# Patient Record
Sex: MALE | Race: WHITE | HISPANIC OR LATINO | Employment: OTHER | URBAN - METROPOLITAN AREA
[De-identification: names, ages, dates, MRNs, and addresses within clinical notes are randomized per-mention and may not be internally consistent; named-entity substitution may affect disease eponyms.]

---
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
## 2022-02-02 ENCOUNTER — TELEPHONE (OUTPATIENT)
Dept: PEDIATRICS CLINIC | Age: 22
End: 2022-02-02

## 2022-05-09 ENCOUNTER — TELEPHONE (OUTPATIENT)
Dept: LAB | Facility: HOSPITAL | Age: 22
End: 2022-05-09

## 2022-05-10 NOTE — TELEPHONE ENCOUNTER
05/10/22 6:41 PM     Thank you for your request  Your request has been received, reviewed, and the patient chart updated  The PCP has successfully been removed with a patient attribution note  This message will now be completed      Thank you  Boston Paul

## 2023-04-26 ENCOUNTER — TELEPHONE (OUTPATIENT)
Dept: LAB | Facility: HOSPITAL | Age: 23
End: 2023-04-26

## 2023-05-04 ENCOUNTER — APPOINTMENT (OUTPATIENT)
Dept: LAB | Facility: HOSPITAL | Age: 23
End: 2023-05-04

## 2023-05-04 DIAGNOSIS — Z00.00 ANNUAL PHYSICAL EXAM: ICD-10-CM

## 2023-05-04 LAB
ALBUMIN SERPL BCP-MCNC: 4.2 G/DL (ref 3.5–5)
ALP SERPL-CCNC: 82 U/L (ref 46–116)
ALT SERPL W P-5'-P-CCNC: 67 U/L (ref 12–78)
ANION GAP SERPL CALCULATED.3IONS-SCNC: 7 MMOL/L (ref 4–13)
AST SERPL W P-5'-P-CCNC: 30 U/L (ref 5–45)
BASOPHILS # BLD AUTO: 0.01 THOUSANDS/ÂΜL (ref 0–0.1)
BASOPHILS NFR BLD AUTO: 0 % (ref 0–1)
BILIRUB SERPL-MCNC: 0.56 MG/DL (ref 0.2–1)
BUN SERPL-MCNC: 11 MG/DL (ref 5–25)
CALCIUM SERPL-MCNC: 9.5 MG/DL (ref 8.3–10.1)
CHLORIDE SERPL-SCNC: 105 MMOL/L (ref 96–108)
CHOLEST SERPL-MCNC: 134 MG/DL
CO2 SERPL-SCNC: 27 MMOL/L (ref 21–32)
CREAT SERPL-MCNC: 0.94 MG/DL (ref 0.6–1.3)
EOSINOPHIL # BLD AUTO: 0.07 THOUSAND/ÂΜL (ref 0–0.61)
EOSINOPHIL NFR BLD AUTO: 1 % (ref 0–6)
ERYTHROCYTE [DISTWIDTH] IN BLOOD BY AUTOMATED COUNT: 13.2 % (ref 11.6–15.1)
GFR SERPL CREATININE-BSD FRML MDRD: 115 ML/MIN/1.73SQ M
GLUCOSE P FAST SERPL-MCNC: 92 MG/DL (ref 65–99)
HCT VFR BLD AUTO: 50.9 % (ref 36.5–49.3)
HDLC SERPL-MCNC: 40 MG/DL
HGB BLD-MCNC: 16.8 G/DL (ref 12–17)
IMM GRANULOCYTES # BLD AUTO: 0.01 THOUSAND/UL (ref 0–0.2)
IMM GRANULOCYTES NFR BLD AUTO: 0 % (ref 0–2)
LDLC SERPL CALC-MCNC: 66 MG/DL (ref 0–100)
LYMPHOCYTES # BLD AUTO: 2.04 THOUSANDS/ÂΜL (ref 0.6–4.47)
LYMPHOCYTES NFR BLD AUTO: 27 % (ref 14–44)
MCH RBC QN AUTO: 27.5 PG (ref 26.8–34.3)
MCHC RBC AUTO-ENTMCNC: 33 G/DL (ref 31.4–37.4)
MCV RBC AUTO: 83 FL (ref 82–98)
MONOCYTES # BLD AUTO: 0.67 THOUSAND/ÂΜL (ref 0.17–1.22)
MONOCYTES NFR BLD AUTO: 9 % (ref 4–12)
NEUTROPHILS # BLD AUTO: 4.69 THOUSANDS/ÂΜL (ref 1.85–7.62)
NEUTS SEG NFR BLD AUTO: 63 % (ref 43–75)
NONHDLC SERPL-MCNC: 94 MG/DL
NRBC BLD AUTO-RTO: 0 /100 WBCS
PLATELET # BLD AUTO: 247 THOUSANDS/UL (ref 149–390)
PMV BLD AUTO: 10.4 FL (ref 8.9–12.7)
POTASSIUM SERPL-SCNC: 4.1 MMOL/L (ref 3.5–5.3)
PROT SERPL-MCNC: 8 G/DL (ref 6.4–8.4)
RBC # BLD AUTO: 6.1 MILLION/UL (ref 3.88–5.62)
SODIUM SERPL-SCNC: 139 MMOL/L (ref 135–147)
TRIGL SERPL-MCNC: 140 MG/DL
TSH SERPL DL<=0.05 MIU/L-ACNC: 1.55 UIU/ML (ref 0.45–4.5)
WBC # BLD AUTO: 7.49 THOUSAND/UL (ref 4.31–10.16)

## 2023-05-06 LAB
GAMMA INTERFERON BACKGROUND BLD IA-ACNC: 0.04 IU/ML
M TB IFN-G BLD-IMP: NEGATIVE
M TB IFN-G CD4+ BCKGRND COR BLD-ACNC: 0.01 IU/ML
M TB IFN-G CD4+ BCKGRND COR BLD-ACNC: 0.01 IU/ML
MITOGEN IGNF BCKGRD COR BLD-ACNC: >10 IU/ML

## 2023-05-08 ENCOUNTER — TELEPHONE (OUTPATIENT)
Age: 23
End: 2023-05-08

## 2023-05-08 NOTE — TELEPHONE ENCOUNTER
----- Message from [address] sent at 5/7/2023  5:25 PM EDT -----  Please let the patient's parent know there is no significant abnormality on his lab work  Please provide his mother with a copy of the [employer] gold, that is documentation for his TB testing

## 2023-05-15 ENCOUNTER — TELEPHONE (OUTPATIENT)
Age: 23
End: 2023-05-15

## 2023-05-15 NOTE — TELEPHONE ENCOUNTER
Mom contacted the office to request a copy of patients TB results  Results faxed to mom at [phone]  Confirmation received and scanned

## 2023-05-16 ENCOUNTER — TELEPHONE (OUTPATIENT)
Age: 23
End: 2023-05-16

## 2023-05-16 NOTE — TELEPHONE ENCOUNTER
Received request for medical records from [address] on 5/16/2023 to [facility] phone [phone]

## 2023-05-24 ENCOUNTER — TELEPHONE (OUTPATIENT)
Age: 23
End: 2023-05-24

## 2023-05-24 NOTE — TELEPHONE ENCOUNTER
Sotero Overton will be dropping off form to be completed correctly by Manfred  She said she'll come into the office this Fri May 26       The correct box has to be checked- [facility] ID Care (care in home and community)    Manfred signed it; she has to cross out nursing home, date, initial and sign again

## 2023-05-26 NOTE — TELEPHONE ENCOUNTER
The box was checked off by Manfred and initialed, given to [place] to be scanned in     Then Nat Marti came back and dropped off physical form to be filled out by Manfred, left in black bin in reception     Call Ofelia Lee when ready to be picked up # [phone]

## 2023-08-03 DIAGNOSIS — R05.9 COUGH, UNSPECIFIED TYPE: Primary | ICD-10-CM

## 2023-08-03 DIAGNOSIS — R52 PAIN, GENERALIZED: ICD-10-CM

## 2023-08-03 RX ORDER — MENTHOL
LOZENGE MUCOUS MEMBRANE 3 TIMES DAILY PRN
Qty: 100 G | Refills: 0 | Status: SHIPPED | OUTPATIENT
Start: 2023-08-03

## 2023-08-03 RX ORDER — ACETAMINOPHEN 160 MG/5ML
320 SUSPENSION ORAL EVERY 4 HOURS PRN
Qty: 118 ML | Refills: 2 | Status: SHIPPED | OUTPATIENT
Start: 2023-08-03

## 2023-08-08 ENCOUNTER — OFFICE VISIT (OUTPATIENT)
Dept: URGENT CARE | Age: 23
End: 2023-08-08
Payer: COMMERCIAL

## 2023-08-08 VITALS — OXYGEN SATURATION: 98 % | HEART RATE: 135 BPM | TEMPERATURE: 99.2 F | RESPIRATION RATE: 16 BRPM

## 2023-08-08 DIAGNOSIS — R05.1 ACUTE COUGH: Primary | ICD-10-CM

## 2023-08-08 PROCEDURE — 99213 OFFICE O/P EST LOW 20 MIN: CPT | Performed by: PHYSICIAN ASSISTANT

## 2023-08-08 RX ORDER — BENZONATATE 100 MG/1
100 CAPSULE ORAL 3 TIMES DAILY PRN
Qty: 20 CAPSULE | Refills: 0 | Status: SHIPPED | OUTPATIENT
Start: 2023-08-08

## 2023-08-08 NOTE — PATIENT INSTRUCTIONS
Madalyn Gupta as prescribed. If symptoms are not improved in 3-5 days, follow-up with PCP. If symptoms worsen or new symptoms develop, report to the emergency department immediately.

## 2023-08-08 NOTE — PROGRESS NOTES
[facility]        NAME: Luigi Valdes is a 26 y.o. male  : 2000    MRN: [id number]  DATE: 2023  TIME: 11:33 AM    Assessment and Plan   Acute cough [R05.1]  1. Acute cough  benzonatate (TESSALON PERLES) 100 mg capsule      Pt presents with acute cough of likely viral origin. Rx for Tessalon Perles to treat as he cannot take pseudoephedrine or dextromethorphan. Patient Instructions     Patient Instructions   [employer] as prescribed. If symptoms are not improved in 3-5 days, follow-up with PCP. If symptoms worsen or new symptoms develop, report to the emergency department immediately. Follow up with PCP in 3-5 days. Proceed to  ER if symptoms worsen. Chief Complaint     Chief Complaint   Patient presents with   • Cough     2 day duration of wet sounding cough         History of Present Illness       26year old mentally disabled male presents with group home staff with concerns of wet sounding cough x 2 days. Staff denies fever, chills, runny nose, and congestion. No other concerns or complaints today. Review of Systems   Review of Systems   Constitutional: Negative for chills, fatigue and fever. HENT: Negative for congestion, postnasal drip, rhinorrhea, sore throat, trouble swallowing and voice change. Respiratory: Positive for cough. Negative for shortness of breath. Cardiovascular: Negative for chest pain. Gastrointestinal: Negative for diarrhea, nausea and vomiting. Musculoskeletal: Negative for myalgias. Neurological: Negative for headaches.          Current Medications       Current Outpatient Medications:   •  benzonatate (TESSALON PERLES) 100 mg capsule, Take 1 capsule (100 mg total) by mouth 3 (three) times a day as needed for cough, Disp: 20 capsule, Rfl: 0  •  acetaminophen (TYLENOL) 160 mg/5 mL liquid, Take 10 mL (320 mg total) by mouth every 4 (four) hours as needed for mild pain or moderate pain, Disp: 118 mL, Rfl: 2  •  albuterol (2.5 mg/3 mL) 0.083 % nebulizer solution, Use every 4 hours as needed for wheezing via nebulizer. (Patient not taking: Reported on 4/21/2023), Disp: 30 vial, Rfl: 3  •  albuterol (PROVENTIL HFA,VENTOLIN HFA) 90 mcg/act inhaler, Use 2 puffs every 4 hours for wheezing or severe cough as needed (Patient not taking: Reported on 4/21/2023), Disp: 2 Inhaler, Rfl: 3  •  busPIRone (BUSPAR) 5 mg tablet, Take 5 mg by mouth 2 (two) times a day, Disp: , Rfl: 0  •  Camphor-Eucalyptus-Menthol (Vicks Vaporub) 4.73-1.2-2.6 % OINT, Apply topically 3 (three) times a day as needed (cough), Disp: 100 g, Rfl: 0  •  clonazePAM (KlonoPIN) 1 mg tablet, TAKE 1 TABLET BY MOUTH EVERY DAY AS NEEDED AGITATION OR 1 HOUR PRIOR TO PROCEDURES (Patient not taking: Reported on 4/21/2023), Disp: , Rfl: 1  •  diphenhydrAMINE (DIPHENHIST) 12.5 mg/5 mL oral liquid, Take 10 ml PO PRN congestion.  (Patient not taking: Reported on 4/21/2023), Disp: 120 mL, Rfl: 0  •  guanFACINE (TENEX) 2 MG tablet, Take 2 mg by mouth 2 (two) times a day, Disp: , Rfl: 0  •  hydrOXYzine (ATARAX) 10 mg/5 mL syrup, GIVE 2 TEASPOONSFUL BY MOUTH AT BEDTIME AS NEEDED, Disp: 120 mL, Rfl: 2  •  Polyethylene Glycol 3350 GRAN, Take by mouth daily (Patient not taking: Reported on 4/21/2023), Disp: , Rfl:   •  risperiDONE (RisperDAL) 1 mg/mL oral solution, GIVE 1.25ML BY MOUTH TWICE A DAY, Disp: , Rfl: 1  •  Spacer/Aero-Holding Chambers (AEROCHAMBER PLUS) inhaler, Use as instructed (Patient not taking: Reported on 4/21/2023), Disp: 1 each, Rfl: 1  •  temazepam (RESTORIL) 15 mg capsule, TAKE 1 CAPSULE BY MOUTH EVERY DAY AT NIGHT, Disp: , Rfl:     Current Allergies     Allergies as of 08/08/2023   • (No Known Allergies)            The following portions of the patient's history were reviewed and updated as appropriate: allergies, current medications, past family history, past medical history, past social history, past surgical history and problem list.     Past Medical History:   Diagnosis Date • Autism 2005    Diagnosed at [facility], in [facility]   • Baby premature 32 weeks 2000    32 week Caesarean section at [facility]. Twin B. Required positive pressure ventilation in the OR, and then nasal CPAP. Birth weight 5 lb 9 oz. Discharged at 11days of age. • Heart murmur     Innocent heart murmur in early childhood   •  jaundice 2002    Required phototherapy   • Otitis media    • Pneumonia     Outpatient   • Pneumonia     Outpatient       Past Surgical History:   Procedure Laterality Date   • CIRCUMCISION  2002       Family History   Problem Relation Age of Onset   • No Known Problems Mother    • No Known Problems Father    • Autism Brother    • Seizures Brother    • Liver disease Brother         History of elevated liver enzymes, resolved   • Asthma Maternal Grandmother    • Diabetes Maternal Grandmother    • Hypertension Maternal Grandfather    • Hyperlipidemia Maternal Grandfather    • Diabetes Paternal Grandmother    • No Known Problems Paternal Grandfather    • Asthma Maternal Aunt    • Seizures Family         Distant relatives         Medications have been verified. Objective   Pulse (!) 135   Temp 99.2 °F (37.3 °C)   Resp 16   SpO2 98%   No LMP for male patient. Physical Exam     Physical Exam  Constitutional:       General: He is awake. He is not in acute distress. Appearance: Normal appearance. He is well-developed and well-groomed. He is not ill-appearing, toxic-appearing or diaphoretic. Comments: Examination severely limited as patient refused to enter clinic and was examined in hallway and would not allow exam beyond heart and lungs. HENT:      Head: Normocephalic and atraumatic. Right Ear: Hearing and external ear normal.      Left Ear: Hearing and external ear normal.   Eyes:      General: Lids are normal. Vision grossly intact. Gaze aligned appropriately.    Cardiovascular:      Rate and Rhythm: Normal rate and regular rhythm. Heart sounds: Normal heart sounds, S1 normal and S2 normal. Heart sounds not distant. No murmur heard. No friction rub. No gallop. Pulmonary:      Effort: Pulmonary effort is normal.      Breath sounds: Normal breath sounds. No decreased breath sounds, wheezing, rhonchi or rales. Comments: Patient is speaking in full sentences with no increased respiratory effort. No audible wheezing or stridor. Musculoskeletal:      Cervical back: Normal range of motion. Skin:     General: Skin is warm and dry. Neurological:      Mental Status: He is alert and oriented to person, place, and time. Coordination: Coordination is intact. Gait: Gait is intact. Psychiatric:         Attention and Perception: Attention and perception normal.         Mood and Affect: Mood and affect normal.         Speech: Speech normal.         Behavior: Behavior is agitated. Behavior is cooperative. Note: Portions of this record may have been created with voice recognition software. Occasional wrong word or "sound a like" substitutions may have occurred due to the inherent limitations of voice recognition software. Please read the chart carefully and recognize, using context, where substitutions have occurred. *

## 2023-09-05 ENCOUNTER — TELEPHONE (OUTPATIENT)
Age: 23
End: 2023-09-05

## 2023-09-05 DIAGNOSIS — E73.9 LACTOSE INTOLERANCE: Primary | ICD-10-CM

## 2023-09-05 DIAGNOSIS — J06.9 ACUTE URI: ICD-10-CM

## 2023-09-05 RX ORDER — NICOTINE POLACRILEX 2 MG/1
1 LOZENGE ORAL DAILY
Qty: 90 TABLET | Refills: 0 | Status: SHIPPED | OUTPATIENT
Start: 2023-09-05

## 2023-09-05 NOTE — TELEPHONE ENCOUNTER
Mother LVM that patient is in a group home and needs a script for liquid benadryl 12.5ml    Also needs script sent over for lactose tablets

## 2023-09-05 NOTE — TELEPHONE ENCOUNTER
Lactase 9000 units chewable was sent. Please ask mom to reach out to the ordering provider regarding switching antibiotic.

## 2023-09-05 NOTE — TELEPHONE ENCOUNTER
Please asked mom to confirm the dose of lactose tablet. Giovany Smith is also on hydroxyzine, Benadryl and hydroxyzine should never be given together.

## 2023-09-05 NOTE — TELEPHONE ENCOUNTER
Called mother and she wishes she new mg of lactulose it is otc and wondering if it could be written as directions on box and wondering if he could;d get a z- ovidio as was abelardo at [facility] clinic and they prescribed him 500 mg amoxicillin and he is still congested and this is why she has been giving benadryl to dry him up

## 2024-02-09 ENCOUNTER — TELEPHONE (OUTPATIENT)
Age: 24
End: 2024-02-09

## 2024-02-09 NOTE — TELEPHONE ENCOUNTER
Spoke to jordi and was notified he has not been seen in our office since 4/21/2023 and provider katy was seen by no longer here

## 2024-04-23 ENCOUNTER — OFFICE VISIT (OUTPATIENT)
Age: 24
End: 2024-04-23
Payer: COMMERCIAL

## 2024-04-23 DIAGNOSIS — R09.81 CONGESTION OF NASAL SINUS: ICD-10-CM

## 2024-04-23 DIAGNOSIS — Z00.00 ANNUAL PHYSICAL EXAM: Primary | ICD-10-CM

## 2024-04-23 DIAGNOSIS — R52 PAIN, GENERALIZED: ICD-10-CM

## 2024-04-23 DIAGNOSIS — F84.0 AUTISTIC DISORDER: ICD-10-CM

## 2024-04-23 PROCEDURE — 99395 PREV VISIT EST AGE 18-39: CPT

## 2024-04-23 RX ORDER — BUSPIRONE HYDROCHLORIDE 15 MG/1
15 TABLET ORAL 2 TIMES DAILY
COMMUNITY

## 2024-04-23 RX ORDER — ACETAMINOPHEN 160 MG/5ML
320 SUSPENSION ORAL EVERY 4 HOURS PRN
Qty: 118 ML | Refills: 2 | Status: SHIPPED | OUTPATIENT
Start: 2024-04-23

## 2024-04-23 RX ORDER — MULTIVIT-MIN/FOLIC ACID/HRB293 120 MCG-50
2 TABLET,CHEWABLE ORAL DAILY
Qty: 120 TABLET | Refills: 4 | Status: SHIPPED | OUTPATIENT
Start: 2024-04-23

## 2024-04-23 NOTE — ASSESSMENT & PLAN NOTE
Patient has severe autism and uncooperative with most of the physical exam.  Completed paperwork to the best of my ability, however was not able to obtain vital signs.  Patient does look a little overweight, but not morbidly obese.  Reconciled medications from group home/psych today.    Referral placed to social work to discuss whether there are options for this patient to get the exams he needs done on the same day and with sedation and monitoring as far as eye exams, dental, and podiatry.

## 2024-04-23 NOTE — PROGRESS NOTES
ADULT ANNUAL PHYSICAL  [facility] [place]    NAME: Jared Reyes  AGE: 24 y.o. SEX: male  : 2000     DATE: 2024     Assessment and Plan:     Problem List Items Addressed This Visit          Behavioral Health    Autistic disorder     Patient has severe autism and uncooperative with most of the physical exam.  Completed paperwork to the best of my ability, however was not able to obtain vital signs.  Patient does look a little overweight, but not morbidly obese.  Reconciled medications from group home/psych today.    Referral placed to social work to discuss whether there are options for this patient to get the exams he needs done on the same day and with sedation and monitoring as far as eye exams, dental, and podiatry.         Relevant Medications    busPIRone (BUSPAR) 15 mg tablet    Other Relevant Orders    Ambulatory Referral to Social Work Care Management Program     Other Visit Diagnoses       Annual physical exam    -  Primary    Up-to-date on immunizations, copy from chart given to mom along with paperwork.  TB was checked 1 year ago.    Relevant Medications    Sunscreen Sport SPF 70 LOTN    Multiple Vitamins-Minerals (Alive Mens Gummy Multivitamins) CHEW    Pain, generalized        Patient needed as needed Tylenol ordered for his group home.    Relevant Medications    acetaminophen (TYLENOL) 160 mg/5 mL liquid    Congestion of nasal sinus        Patient needed as needed Benadryl ordered for his group home for when he has congestion.  May give every 6 hours as needed.    Relevant Medications    diphenhydrAMINE (Diphenhist) 12.5 mg/5 mL oral liquid            Immunizations and preventive care screenings were discussed with patient today. Appropriate education was printed on patient's after visit summary.    Counseling:  Dental Health: discussed importance of regular tooth brushing, flossing, and dental visits.  Injury prevention: discussed  "safety/seat belts, safety helmets, smoke detectors, carbon dioxide detectors, and smoking near bedding or upholstery.  Exercise: the importance of regular exercise/physical activity was discussed. Recommend exercise 3-5 times per week for at least 30 minutes.       Depression Screening and Follow-up Plan: Patient not screened for depression due to medical reason (urgent/emergent situation, decreased capacity). Pt has severe autism and uncooperative and unable to answer questions.        Return in about 1 year (around 4/23/2025) for Annual physical.     Chief Complaint:     No chief complaint on file.     History of Present Illness:     Adult Annual Physical   Patient here for a comprehensive physical exam. The patient reports no problems.    Pt is resident of [facility].  Lives there with his brother.  Hx of autism, asthma, allergic rhinitis, and constipation.  Pt is here with mother and stepfather.    Overall pt is uncooperative with exam.  He would not allow vital signs or any type of physical exam done.  Pt is here with his mother and step father.  Patient observed and was able to palpate his skin, has a 2+ radial pulse, and he demonstrated the ability to hear, interact and answer questions appropriately (though it was usually to say no).    Mother reports he does not need rescue inhaler and has no issues with his asthma.    TDaP given one year ago.  TB quantiferon was negative 5/4/2023.  Other vaccines are UTD.  Pt required five people to hold him down to do TDaP last year.    Diet and Physical Activity  Diet/Nutrition: well balanced diet.   Exercise: no formal exercise.      Depression Screening  PHQ-2/9 Depression Screening           General Health  Sleep: sleeps poorly and is woken up every 30 minutes for a welfare check at his group home. .   Hearing: normal - bilateral and seems to interact appropriately when asked questions and answers \"no\" .  Vision: wears glasses and does not seem to have issues with " vision .   Dental:  pt uncooperative with exams .        Health  History of STDs?: no.    Advanced Care Planning  Do you have an advanced directive? no  Do you have a durable medical power of ? no  ACP document given to the patient? no     Review of Systems:     Review of Systems   Unable to perform ROS: Psychiatric disorder (PT is severely autistic and unable to answer)     Mother reports no acute issues at this time.        Past Medical History:     Past Medical History:   Diagnosis Date    Autism     Diagnosed at [facility], in [place], [place]    Baby premature 32 weeks 2000    32 week Caesarean section at [facility].  Twin B. Required positive pressure ventilation in the OR, and then nasal CPAP.  Birth weight 5 lb 9 oz.  Discharged at 5 days of age.    Heart murmur     Innocent heart murmur in early childhood     jaundice 2002    Required phototherapy    Otitis media     Pneumonia 2003    Outpatient    Pneumonia 2008    Outpatient      Past Surgical History:     Past Surgical History:   Procedure Laterality Date    CIRCUMCISION  2002      Social History:     Social History     Socioeconomic History    Marital status: Single     Spouse name: None    Number of children: None    Years of education: None    Highest education level: None   Occupational History    None   Tobacco Use    Smoking status: Never    Smokeless tobacco: Never   Substance and Sexual Activity    Alcohol use: None    Drug use: None    Sexual activity: None   Other Topics Concern    None   Social History Narrative    Lives with parents, , and twin brother    Carbon monoxide detector in home    Smoke detector in home    No tobacco/smoke exposure at home    No guns in the home    Pets: Cat     Social Determinants of Health     Financial Resource Strain: Not on file   Food Insecurity: Not on file   Transportation Needs: Not on file   Physical Activity: Not on file   Stress: Not  on file   Social Connections: Not on file   Intimate Partner Violence: Not on file   Housing Stability: Not on file      Family History:     Family History   Problem Relation Age of Onset    No Known Problems Mother     No Known Problems Father     Autism Brother     Seizures Brother     Liver disease Brother         History of elevated liver enzymes, resolved    Asthma Maternal Grandmother     Diabetes Maternal Grandmother     Hypertension Maternal Grandfather     Hyperlipidemia Maternal Grandfather     Diabetes Paternal Grandmother     No Known Problems Paternal Grandfather     Asthma Maternal Aunt     Seizures Family         Distant relatives      Current Medications:     Current Outpatient Medications   Medication Sig Dispense Refill    acetaminophen (TYLENOL) 160 mg/5 mL liquid Take 10 mL (320 mg total) by mouth every 4 (four) hours as needed for mild pain or moderate pain 118 mL 2    busPIRone (BUSPAR) 15 mg tablet Take 15 mg by mouth 2 (two) times a day      diphenhydrAMINE (Diphenhist) 12.5 mg/5 mL oral liquid Take 10 ml PO EVERY 6 HOURS PRN congestion. 120 mL 0    Multiple Vitamins-Minerals (Alive Mens Gummy Multivitamins) CHEW Chew 2 each in the morning 120 tablet 4    Sunscreen Sport SPF 70 LOTN Apply topically every 4 (four) hours as needed (when outside) 112 mL 5    benzonatate (TESSALON PERLES) 100 mg capsule Take 1 capsule (100 mg total) by mouth 3 (three) times a day as needed for cough 20 capsule 0    Camphor-Eucalyptus-Menthol (Vicks Vaporub) 4.73-1.2-2.6 % OINT Apply topically 3 (three) times a day as needed (cough) 100 g 0    guanFACINE (TENEX) 2 MG tablet Take 2 mg by mouth 2 (two) times a day  0    risperiDONE (RisperDAL) 1 mg/mL oral solution Take 2 mg by mouth 2 (two) times a day  1    temazepam (RESTORIL) 15 mg capsule Take 7.5 mg by mouth daily at bedtime       No current facility-administered medications for this visit.      Allergies:     No Known Allergies   Physical Exam:     There were  no vitals taken for this visit.    Physical Exam  Vitals and nursing note reviewed.   Constitutional:       General: He is not in acute distress.     Appearance: Normal appearance. He is not toxic-appearing.   HENT:      Head: Normocephalic and atraumatic.      Right Ear: Hearing and external ear normal.      Left Ear: Hearing and external ear normal.      Nose: Nose normal.      Mouth/Throat:      Lips: Pink.      Mouth: Mucous membranes are moist.   Eyes:      General: Lids are normal. Vision grossly intact.   Cardiovascular:      Rate and Rhythm: Normal rate.   Pulmonary:      Effort: Pulmonary effort is normal. No respiratory distress.   Abdominal:      General: Abdomen is flat.      Comments: Patient did not indicate any abdominal pain.   Musculoskeletal:         General: No tenderness, deformity or signs of injury.      Cervical back: Full passive range of motion without pain.      Comments: Pt is able to stand from the seated position, ambulated without assistance, moved all 4 extremities without difficulty, and did not indicate he was in any pain.   Skin:     General: Skin is warm and dry.      Capillary Refill: Capillary refill takes less than 2 seconds.      Coloration: Skin is not cyanotic or jaundiced.   Neurological:      Mental Status: He is alert. He is confused.      Comments: Patient has profound autism          Shyla Sparrow PA-C   [facility] [place]

## 2024-04-24 ENCOUNTER — PATIENT OUTREACH (OUTPATIENT)
Age: 24
End: 2024-04-24

## 2024-04-24 NOTE — PROGRESS NOTES
COSMO ARANA received referral for patient from Shyla Sparrow PA-C, regarding patient have difficulty at doctor's appointments. COSMO ARANA also discussed referral via IB messages with Shyla Sparrow. Per chart review, patient has severe autism and struggles with medical appointments. Referral was placed to see if there is a way patient can have medical exams done on the same day with sedation and monitoring as far as eye exams, dental and podiatry.    COSMO ARANA placed call to [employer] of [facility] and [employer] to ask if there is any assistance, are any resources or guidance they can provide to assist this patient and his mother. COSMO ARANA also sent an email to [employer] regarding this.    COSMO ARANA placed call to patient's mother, introduced self and role and explained reasoning for call. Patient's mother reported that the patient has always struggled with medical appointments, as he has high anxiety when it comes to them, and she reported that his toenails are so long, but she is unable to cut them. COSMO ARANA asked if patient does any better if it is with a provider he knows and has seen a few times, and patient's mother reported that it does not matter who it is. COSMO ARANA explained the outreaches made to the resources above to see if there is any guidance that can be given to assist with this. Patient's mother appreciative of outreach.    COSMO ARANA to continue to follow patient.

## 2024-05-01 ENCOUNTER — PATIENT OUTREACH (OUTPATIENT)
Age: 24
End: 2024-05-01

## 2024-05-01 NOTE — PROGRESS NOTES
COSMO ARANA placed follow-up call to the Autism Society of the [facility] to obtain information for patient's mother regarding patient's struggle with medical appointments. COSMO ARANA had to leave another voicemail.    COSMO ARANA also reached out to [employer] to follow-up and had to leave another voicemail. The email sent previously was also not received and bounced back to COSMO ARANA.    COSMO ARANA also placed a call to the [facility] and [facility] to obtain information. COSMO ARANA left a voicemail for a return call.    COSMO ARANA to continue to follow patient.   99

## 2024-05-02 ENCOUNTER — TELEPHONE (OUTPATIENT)
Age: 24
End: 2024-05-02

## 2024-05-02 NOTE — TELEPHONE ENCOUNTER
Asking if you requested patient was recommended to be on a toilet schedule?    Pt is in a group home.

## 2024-05-03 ENCOUNTER — PATIENT OUTREACH (OUTPATIENT)
Age: 24
End: 2024-05-03

## 2024-05-03 NOTE — PROGRESS NOTES
After Visit Summary   2017    Manuel Gasca    MRN: [id number]           Patient Information     Date Of Birth          1992        Visit Information        Provider Department      2017 2:45 PM Fay Young MD [facility] Ear Nose and Throat        Today's Diagnoses     Chronic pansinusitis    -  1    Cystic fibrosis with pulmonary manifestations (H)          Care Instructions    Please follow up in 17 at 8AM for general follow up.   Darin Graham RN  [phone]              Follow-ups after your visit        Who to contact     Please call your clinic at [phone] to:    Ask questions about your health    Make or cancel appointments    Discuss your medicines    Learn about your test results    Speak to your doctor   If you have compliments or concerns about an experience at your clinic, or if you wish to file a complaint, please contact [facility] Patient Relations at [phone] or email us at [email or web address]         Additional Information About Your Visit        MyChart Information     Matchbox is an electronic gateway that provides easy, online access to your medical records. With Matchbox, you can request a clinic appointment, read your test results, renew a prescription or communicate with your care team.     To sign up for Matchbox visit the website at [email or web address]   You will be asked to enter the access code listed below, as well as some personal information. Please follow the directions to create your username and password.     Your access code is: [email or web address]  Expires: 10/3/2017  6:30 AM     Your access code will  in 90 days. If you need help or a new code, please contact your [facility] or call [phone] for assistance.        Care EveryWhere ID     This is your Care EveryWhere ID. This could be used by other organizations to access your [facility]  COSMO ARANA reached out to [place] [facility] via email to obtain resources to assist patient with medical appointments.    COSMO ARANA to follow-up next week and provide outreach to patient's mother.   "records  [id number]        Your Vitals Were     Height BMI (Body Mass Index)                1.956 m (6' 5.01\") 24.78 kg/m2           Blood Pressure from Last 3 Encounters:   08/11/17 116/65   07/27/17 124/76   10/07/16 121/65    Weight from Last 3 Encounters:   08/23/17 94.8 kg (208 lb 15.9 oz)   08/11/17 94.8 kg (209 lb)   07/27/17 94.8 kg (209 lb 1.6 oz)              We Performed the Following     NASAL/SINUS SCOPE WITH BIOPSY/POLYPECT/DEBRIDE,ENT        Primary Care Provider Office Phone # Fax #    Delbert Jennings [phone]       [facility] [address] [facility] [address]        Equal Access to Services     BRITTANY HER : Sravan cummins Soemiilano, waaxda luqadaha, qaybta kaalmada radha, lenora bella . So [facility] [phone].    ATENCIÓN: Si habla español, tiene a toledo disposición servicios gratuitos de asistencia lingüística. Dorina al [phone].    We comply with applicable federal civil rights laws and [place] laws. We do not discriminate on the basis of race, color, national origin, age, disability sex, sexual orientation or gender identity.            Thank you!     Thank you for choosing [facility] EAR NOSE AND THROAT  for your care. Our goal is always to provide you with excellent care. Hearing back from our patients is one way we can continue to improve our services. Please take a few minutes to complete the written survey that you may receive in the mail after your visit with us. Thank you!             Your Updated Medication List - Protect others around you: Learn how to safely use, store and throw away your medicines at www.disposemymeds.org.          This list is accurate as of: 8/23/17 11:59 PM.  Always use your most recent med list.                   Brand Name Dispense Instructions for use Diagnosis    fluticasone 50 MCG/ACT spray    FLONASE    1 Bottle    Spray 2 sprays into both nostrils daily    Cystic fibrosis with " pulmonary manifestations (H)       HYDROcodone-acetaminophen 5-325 MG per tablet    NORCO    20 tablet    Take 1 tablet by mouth every 4 hours as needed for moderate to severe pain maximum 6 tablet(s) per day    Post-op pain       levofloxacin 500 MG tablet    LEVAQUIN    21 tablet    Take 1 tablet (500 mg) by mouth daily for 21 days    Cystic fibrosis (H)       methylPREDNISolone 4 MG tablet    MEDROL DOSEPAK    21 tablet    Take as directed per patient instruction card    Cystic fibrosis with pulmonary manifestations (H), Chronic frontal sinusitis       oxymetazoline 0.05 % spray    AFRIN NASAL SPRAY    1 Bottle    Spray 2-3 sprays into both nostrils 2 times daily as needed (Nose bleed)    Post-op pain       sodium chloride 0.65 % nasal spray    OCEAN    1 Bottle    Spray 1-2 sprays in nostril every hour as needed for congestion (nasal dryness) Use in EACH nostril.    Chronic pansinusitis

## 2024-05-07 ENCOUNTER — PATIENT OUTREACH (OUTPATIENT)
Age: 24
End: 2024-05-07

## 2024-05-07 NOTE — PROGRESS NOTES
COSMO ARANA received follow-up email from Karol from [facility] who provided COSMO ARANA with resources and tips to help manage patient's anxiety. COSMO ARANA also received voicemail from Karol.    COSMO ARANA sent a return email explaining that patient's mother was wondering if there were any services out there to assist with the medical appointments so that they can be completed in full.    COSMO ARANA placed call to patient's mother to give update and left a voicemail.    COSMO ARANA to continue to follow patient.

## 2024-05-09 ENCOUNTER — PATIENT OUTREACH (OUTPATIENT)
Age: 24
End: 2024-05-09

## 2024-05-09 NOTE — PROGRESS NOTES
COSMO ARANA placed follow-up call to Karol from [facility] to see if there were any resources to assist patient in medical appointments. COSMO ARANA was unable to leave a voicemail as the phone kept ringing.    COSMO CM to outreach patient's mother in about a week if return call is not provided prior from previous outreach on 5/7.

## 2024-05-14 ENCOUNTER — PATIENT OUTREACH (OUTPATIENT)
Age: 24
End: 2024-05-14

## 2024-05-14 NOTE — PROGRESS NOTES
COSMO ARANA received voicemail from patient's mother returning COSMO 's call from last week. Patient's mother reported that COSMO ARANA can call back or can email back at [email or web address].    COSMO ARANA placed return call to patient's mother to give update regarding looking for service to assist patient with anxiety around medical appointments. COSMO ARANA reported that Karol from [facility] got back to COSMO  and offered support group information as they are a statewide resource and are unable to provide any specific information. Patient's mother declined the support group information.    Patient's mother asked COSMO ARANA if CM can reach out to PCP to see if she would be able to come to patient's home to assist with clipping his nails. Patient's mother reported that they have not been clipped in months and she is unable to get patient to a podiatrist. COSMO ARANA to route note to PCP. Patient's mother asked that COSMO ARANA call her back to let her know if this is able to be done.    COSMO ARANA to notify patient's mother of this. COSMO ARANA to continue to follow patient.

## 2024-05-16 ENCOUNTER — TELEPHONE (OUTPATIENT)
Age: 24
End: 2024-05-16

## 2024-05-16 NOTE — TELEPHONE ENCOUNTER
Mom called in stating that pt is currently in a group home and that they require a note from glendy Sparrow that pt needs to have something to drink every hour as they need to measure his fluid intake & out-take bc he is dehydrated.    Please advise & call back mom with update or questions. Thank you!    Reyes,Candy (Mother)   [phone] (Mobile)

## 2024-05-17 DIAGNOSIS — F84.0 AUTISTIC DISORDER: Primary | ICD-10-CM

## 2024-05-17 NOTE — PROGRESS NOTES
Called mother and was notified and given dr. Dean's number  of [phone] to call as he comes to home

## 2024-05-21 ENCOUNTER — PATIENT OUTREACH (OUTPATIENT)
Age: 24
End: 2024-05-21

## 2024-05-21 NOTE — PROGRESS NOTES
COSMO ARANA received in basket message from Shyla Sparrow PA-C, stating that she is not able to go to the patient's home and suggested that maybe podiatry can assist. COSMO ARANA asked if a referral needed to be place. Per chart review, referral was placed for podiatry and patient's mother was provided the phone number for [facility] as well as provided with Dr. Dean's phone number to call as he comes to home: [phone].    COSMO ARANA placed follow-up call to patient's mother. Patient's mother reported that she has not had time to check her voicemail regarding the number listed above. Patient's mother asked if this provider would come to the home as patient would be a new and she reported providers do not normally come to the home for a new patient. COSMO ARANA offered to call the number above for patient's mother to try to obtain the information, as well as if they accept the patient's insurance, and will update mother once contact is made.    COSMO ARANA called Dr. Dean from [facility] at [phone] and left a voicemail.    COSMO ARANA to continue to follow.

## 2024-05-28 ENCOUNTER — PATIENT OUTREACH (OUTPATIENT)
Age: 24
End: 2024-05-28

## 2024-05-28 NOTE — PROGRESS NOTES
COSMO ARANA placed follow-up call to Dr. Dean at [facility] as return call was not received from COSMO ARANA's previous voicemail that was left.    COSMO ARANA left another voicemail.    COSMO ARANA placed call to patient's mother and left a voicemail.    COSMO ARANA to follow-up in one week if return call is not received prior.

## 2024-05-29 ENCOUNTER — PATIENT OUTREACH (OUTPATIENT)
Age: 24
End: 2024-05-29

## 2024-05-29 NOTE — PROGRESS NOTES
COSMO ARANA received incoming call from [facility]. COSMO ARANA spoke with staff and they reported that with the patient's insurance, the doctor would not be able to come to the home.    COSMO ARANA also received return call from [facility]. Thai Stringer Advocate from [facility] left a voicemail returning COSMO ARANA's call and asked for return call at [phone] ext. [phone]. COSMO ARANA placed return call and left a voicemail with COSMO ARANA's contact number and email.    COSMO ARANA to wait for response, then update patient's mother with any relevant information.

## 2024-06-04 ENCOUNTER — PATIENT OUTREACH (OUTPATIENT)
Age: 24
End: 2024-06-04

## 2024-06-04 NOTE — PROGRESS NOTES
COSMO ARANA placed follow-up call to [facility] and requested to speak to Thai Stringer. COSMO ARANA was notified that Thai was out of the office today so COSMO ARANA left a voicemail for a return call.    COSMO ARANA placed call to patient's mother. COSMO ARANA notified patient's mother regarding [facility]. Patient's mother understanding, but reported that patient was seen today at [facility] and Dr. Alfred was able to assist the patient with getting his nails cut. Patient's mother gave Dr. Alfred a lot of praise and was happy with the experience. She reported she is hoping to find a way to get the patient to the eye doctor next.    COSMO ARANA notified patient that [facility] had called and COSMO ARANA left follow-up voicemail's, but has not heard back. COSMO ARANA notified patient's mother that if return call is received. COSMO ARANA will notify patient's mother with any useful information.    COSMO ARANA to close episode and remove self from care team as requested information was provided.

## 2024-07-01 ENCOUNTER — NURSE TRIAGE (OUTPATIENT)
Dept: OTHER | Facility: OTHER | Age: 24
End: 2024-07-01

## 2024-07-01 NOTE — TELEPHONE ENCOUNTER
Regarding: Son hasn't had a bowel movement since 6/27 & hasn't urinated within the last 24 hours  ----- Message from Abdirashid MACARIO sent at 7/1/2024  6:11 PM EDT -----  '' My son lives in group home. I went to visit him and I was told that he hadn't had a bowel movement since 6/27 and he hasn't urinated within the last 24 hours. I'm concern and looking for medical advice.''

## 2024-07-01 NOTE — TELEPHONE ENCOUNTER
"Reason for Disposition  • [1] Decreased urination and [2] drinking very little AND [2] dehydration suspected (e.g., dark urine, no urine > 12 hours, very dry mouth, very lightheaded)    Answer Assessment - Initial Assessment Questions  1. SYMPTOM: \"What's the main symptom you're concerned about?\" (e.g., frequency, incontinence)      No urine output since 8pm or possibly longer. Mom has tried to bring pt to the bathroom to urinate but pt will not go.    2. ONSET: \"When did the    start?\"      Since 8pm    3. PAIN: \"Is there any pain?\" If Yes, ask: \"How bad is it?\" (Scale: 1-10; mild, moderate, severe)      \"Seems to be acting differently\".    4. CAUSE: \"What do you think is causing the symptoms?\"      Unknown    5. OTHER SYMPTOMS: \"Do you have any other symptoms?\" (e.g., fever, flank pain, blood in urine, pain with urination)      Mom went to visit son at group home and pt has had about 24 oz of fluid while with mom. Denies fever. No BM since 6/27.    Protocols used: Urinary Symptoms-ADULT-AH    "

## 2024-07-02 ENCOUNTER — TELEPHONE (OUTPATIENT)
Age: 24
End: 2024-07-02

## 2024-07-02 DIAGNOSIS — K59.00 CONSTIPATION, UNSPECIFIED CONSTIPATION TYPE: Primary | ICD-10-CM

## 2024-07-02 RX ORDER — POLYETHYLENE GLYCOL 3350 17 G/17G
17 POWDER, FOR SOLUTION ORAL 2 TIMES DAILY
Qty: 340 G | Refills: 0 | Status: SHIPPED | OUTPATIENT
Start: 2024-07-02 | End: 2024-07-12

## 2024-07-02 NOTE — TELEPHONE ENCOUNTER
Pt's mother called.  States she spoke to after hours service last night.  Pt lives in a group home and they haven't been very good with their documentation.  Pt was NOT taken to ED yesterday because mother encouraged water intake and pt was able to urinate before she left.      However it was documented that pt's last BM was 6/27 Thursday, however another staff member is stating pt's last BM was 6/29 Saturday.  Mother states pt has had constipation issues since he was an infant and this is not uncommon for pt.  However group home has protocols they need to meet.  Mom states she pressed hard on pt's abd and it was soft yesterday, no pain, pt is eating fine, no n/v, no fever.      Mom states group home protocols are for pt's PCP to prescribe something for pt to have a BM OR to take pt to the ED.  Mom states going to dr. Campbell and the ED is very traumatic for pt so she is not trying to go that route.  Mom states in the past that Polyethylene Glycol has been effective for pt.  Mom inquiring if this can be sent in for pt.  Preferred pharmacy pended.  Please review and advise how to proceed.

## 2024-08-27 DIAGNOSIS — R09.81 CONGESTION OF NASAL SINUS: ICD-10-CM

## 2024-08-27 NOTE — TELEPHONE ENCOUNTER
Reason for call:   [x] Refill   [] Prior Auth  [] Other:     Office:   [x] PCP/Provider - Shyla Sparrow PA-C   [] Specialty/Provider -     Medication: (Diphenhist) 12.5 mg     Dose/Frequency: 10 ml PO EVERY 6 HOURS PRN     Quantity: 120 ml    Pharmacy: [facility] [place] [id number] [address]      Does the patient have enough for 3 days?   [x] Yes   [] No - Send as HP to POD

## 2024-08-28 DIAGNOSIS — K59.00 CONSTIPATION, UNSPECIFIED CONSTIPATION TYPE: ICD-10-CM

## 2024-08-28 RX ORDER — POLYETHYLENE GLYCOL 3350 17 G/17G
17 POWDER, FOR SOLUTION ORAL DAILY PRN
Qty: 340 G | Refills: 4 | Status: SHIPPED | OUTPATIENT
Start: 2024-08-28 | End: 2024-09-07

## 2024-08-28 RX ORDER — DIPHENHYDRAMINE HCL 12.5 MG/5ML
SOLUTION ORAL
Qty: 120 ML | Refills: 1 | Status: SHIPPED | OUTPATIENT
Start: 2024-08-28

## 2024-09-03 ENCOUNTER — TELEPHONE (OUTPATIENT)
Age: 24
End: 2024-09-03

## 2024-09-03 DIAGNOSIS — K59.00 CONSTIPATION, UNSPECIFIED CONSTIPATION TYPE: Primary | ICD-10-CM

## 2024-09-03 RX ORDER — BISACODYL 5 MG/1
5 TABLET, DELAYED RELEASE ORAL DAILY PRN
Qty: 30 TABLET | Refills: 0 | Status: SHIPPED | OUTPATIENT
Start: 2024-09-03

## 2024-09-03 NOTE — TELEPHONE ENCOUNTER
Alma called from [facility] (group [facility]) and states pt is new to the facility has not had a bowel movement in 3 days. Requesting a prescription for a PRN like dulcolax or whatever preferred and sent to [facility]      Please call office at [phone]

## 2024-09-09 ENCOUNTER — TELEPHONE (OUTPATIENT)
Age: 24
End: 2024-09-09

## 2024-09-09 DIAGNOSIS — R32 URINARY INCONTINENCE, UNSPECIFIED TYPE: Primary | ICD-10-CM

## 2024-09-09 RX ORDER — DIAPER,BRIEF,ADULT, DISPOSABLE
EACH MISCELLANEOUS
Qty: 30 EACH | Refills: 2 | Status: SHIPPED | OUTPATIENT
Start: 2024-09-09 | End: 2024-09-10

## 2024-09-09 NOTE — TELEPHONE ENCOUNTER
Pt adult under wear is on back order and said to call pcp to send a new script to [facility] and fax to [phone]

## 2024-09-10 DIAGNOSIS — R32 URINARY INCONTINENCE, UNSPECIFIED TYPE: Primary | ICD-10-CM

## 2024-10-01 ENCOUNTER — TELEPHONE (OUTPATIENT)
Age: 24
End: 2024-10-01

## 2024-10-01 DIAGNOSIS — R05.1 ACUTE COUGH: ICD-10-CM

## 2024-10-01 RX ORDER — GUAIFENESIN 200 MG/10ML
200 LIQUID ORAL 3 TIMES DAILY PRN
Qty: 120 ML | Refills: 1 | Status: SHIPPED | OUTPATIENT
Start: 2024-10-01

## 2024-10-01 RX ORDER — BENZONATATE 100 MG/1
100 CAPSULE ORAL 3 TIMES DAILY PRN
Qty: 20 CAPSULE | Refills: 1 | Status: SHIPPED | OUTPATIENT
Start: 2024-10-01

## 2024-10-01 NOTE — TELEPHONE ENCOUNTER
Chica-Group home coordinator reports pt has a slight cough and not bringing up any mucus. They just wanted to start with a cough medication and they need an order for this PRN. PCP please call to further advise @[phone].

## 2024-10-02 ENCOUNTER — TELEPHONE (OUTPATIENT)
Age: 24
End: 2024-10-02

## 2024-10-02 DIAGNOSIS — R05.9 COUGH, UNSPECIFIED TYPE: ICD-10-CM

## 2024-10-02 RX ORDER — MENTHOL
LOZENGE MUCOUS MEMBRANE 3 TIMES DAILY PRN
Qty: 100 G | Refills: 4 | Status: SHIPPED | OUTPATIENT
Start: 2024-10-02

## 2024-10-02 NOTE — TELEPHONE ENCOUNTER
Chica (Patients medical coordinator) from [facility] called stating that the patient has dormant mono and sick symptoms and she would like to know if PCP would order antibiotics since the patient can be very difficult to take to a visit. She states they will schedule a visit if necessary but would rather PCP call something in.           Pharmacy [address]  [phone]

## 2024-10-03 NOTE — TELEPHONE ENCOUNTER
Mom called stating patient does live in a group home but she is the legal guardian.  Also, states prescriptions were sent to the pharmacy for patient for Tessan and Benzonatate. Mom does not feel like the patient should have had cough syrup prescribed. States she feels he should have had an appointment. Also, states due to patients anxiety issues he will not come in the office so she would prefer he have a virtual visit.  Pt is scheduled for an in office visit on Friday, 10/4/24.  Mom wanted patient to have a virtual visit today 10/3/24 but nothing available with Shyla Sparrow.      Warm transfer to office clinical for further assistance.

## 2024-12-05 ENCOUNTER — PATIENT MESSAGE (OUTPATIENT)
Age: 24
End: 2024-12-05

## 2025-01-21 DIAGNOSIS — Z00.00 ANNUAL PHYSICAL EXAM: ICD-10-CM

## 2025-01-21 RX ORDER — MULTIVIT-MIN/FOLIC ACID/HRB293 120 MCG-50
2 TABLET,CHEWABLE ORAL DAILY
Qty: 120 TABLET | Refills: 4 | Status: SHIPPED | OUTPATIENT
Start: 2025-01-21

## 2025-02-19 ENCOUNTER — TELEPHONE (OUTPATIENT)
Age: 25
End: 2025-02-19

## 2025-02-19 DIAGNOSIS — R11.2 NAUSEA AND VOMITING, UNSPECIFIED VOMITING TYPE: Primary | ICD-10-CM

## 2025-02-19 RX ORDER — ONDANSETRON 4 MG/1
4 TABLET, ORALLY DISINTEGRATING ORAL EVERY 8 HOURS PRN
Qty: 20 TABLET | Refills: 1 | Status: SHIPPED | OUTPATIENT
Start: 2025-02-19

## 2025-02-19 RX ORDER — ONDANSETRON 4 MG/1
4 TABLET, FILM COATED ORAL EVERY 8 HOURS PRN
Qty: 20 TABLET | Refills: 1 | Status: SHIPPED | OUTPATIENT
Start: 2025-02-19 | End: 2025-02-19

## 2025-02-19 NOTE — TELEPHONE ENCOUNTER
Candy, mother of pt, called in reporting that pt has begun vomiting as of last night and not able to hold anything down today.    Alvina is requesting if April could please send a script for pt of: ZOFRAN 4 MG ? - same as his brother Alexey.     Script going to the following pharmacy:  [facility]/pharmacy #[id number] - [place], [place] - [address] [phone]     Please advise & call Candy back with update on script. Thank you!    Candy: [phone]

## 2025-02-19 NOTE — TELEPHONE ENCOUNTER
Patient's mother called to state they can not swallow tablets. She is requesting tablets be cancelled and ODT form sent instead.

## 2025-04-02 ENCOUNTER — TELEPHONE (OUTPATIENT)
Age: 25
End: 2025-04-02

## 2025-04-04 ENCOUNTER — TELEPHONE (OUTPATIENT)
Age: 25
End: 2025-04-04

## 2025-04-04 NOTE — TELEPHONE ENCOUNTER
JAZ   HAS  HARD  TIME  COMING  TO      MOTHER   WILL   CALL  THE  DAY  OF  THE  VISIT  TO  REMIND  US   OF  THAT   PT  IS  SEEING  APRIL       will  also   change   pcp  on  son   card

## 2025-04-15 ENCOUNTER — TELEPHONE (OUTPATIENT)
Age: 25
End: 2025-04-15

## 2025-04-15 NOTE — TELEPHONE ENCOUNTER
BRONSON   MOTHER   called   just wants   to  remind   April     that   bronson  is  coming  on  Friday  afternoon  for  an  appt  and  he  hates  our   office   so  they  will  have  hard   time  brining  him  in   she  is  hoping  it  will   go smoothly   the  mother  said  when  he  comes   in  hopefully  we   can  take  him  right    back  with  no   stops  not  even  at  the  scale

## 2025-04-18 ENCOUNTER — OFFICE VISIT (OUTPATIENT)
Age: 25
End: 2025-04-18

## 2025-04-18 DIAGNOSIS — Z00.00 ANNUAL PHYSICAL EXAM: Primary | ICD-10-CM

## 2025-04-18 DIAGNOSIS — F84.0 AUTISTIC DISORDER: ICD-10-CM

## 2025-04-18 DIAGNOSIS — J45.20 MILD INTERMITTENT ASTHMA WITHOUT COMPLICATION: ICD-10-CM

## 2025-04-18 DIAGNOSIS — K59.00 CONSTIPATION, UNSPECIFIED CONSTIPATION TYPE: ICD-10-CM

## 2025-04-18 DIAGNOSIS — J30.9 ALLERGIC RHINITIS, UNSPECIFIED SEASONALITY, UNSPECIFIED TRIGGER: ICD-10-CM

## 2025-04-18 NOTE — PROGRESS NOTES
Pt with hx of profound autism who was not cooperative with visit and would not enter the building.  Staff and mother will look into whether his physical can be done virtually and set up appt with the office.

## 2025-05-09 DIAGNOSIS — Z00.00 ANNUAL PHYSICAL EXAM: ICD-10-CM

## 2025-05-13 ENCOUNTER — TELEPHONE (OUTPATIENT)
Age: 25
End: 2025-05-13

## 2025-05-13 NOTE — TELEPHONE ENCOUNTER
Byron's Mother; Candy called about Byron's virtual appt with April this Thurs May 15.    Candy will be at work.  Someone will be with Byron when doing the virtual.   Is it OK for someone to be with Byron in a different area for the virtual    Candy; his mother requested that a message be sent to his My Chart in regards to the question about his virtual appt with someone else; not her.

## 2025-05-16 NOTE — TELEPHONE ENCOUNTER
[facility] facility called requesting Shyla GALICIA note to be faxed where it states patient has to have someone at home for upcoming visit to be able answer questions for physical and a way to get vital signs. [facility] staffed is trained for that.    Fax # [phone].     Please review.  Thank you

## 2025-05-23 ENCOUNTER — TELEMEDICINE (OUTPATIENT)
Age: 25
End: 2025-05-23
Payer: COMMERCIAL

## 2025-05-23 VITALS
DIASTOLIC BLOOD PRESSURE: 78 MMHG | HEART RATE: 72 BPM | OXYGEN SATURATION: 99 % | TEMPERATURE: 97.9 F | HEIGHT: 70 IN | WEIGHT: 225 LBS | BODY MASS INDEX: 32.21 KG/M2 | SYSTOLIC BLOOD PRESSURE: 120 MMHG | RESPIRATION RATE: 18 BRPM

## 2025-05-23 DIAGNOSIS — R52 PAIN, GENERALIZED: ICD-10-CM

## 2025-05-23 DIAGNOSIS — R09.81 CONGESTION OF NASAL SINUS: ICD-10-CM

## 2025-05-23 DIAGNOSIS — J45.20 MILD INTERMITTENT ASTHMA WITHOUT COMPLICATION: ICD-10-CM

## 2025-05-23 DIAGNOSIS — Z00.00 ANNUAL PHYSICAL EXAM: Primary | ICD-10-CM

## 2025-05-23 DIAGNOSIS — R05.9 COUGH, UNSPECIFIED TYPE: ICD-10-CM

## 2025-05-23 DIAGNOSIS — K59.00 CONSTIPATION, UNSPECIFIED CONSTIPATION TYPE: ICD-10-CM

## 2025-05-23 DIAGNOSIS — R11.2 NAUSEA AND VOMITING, UNSPECIFIED VOMITING TYPE: ICD-10-CM

## 2025-05-23 DIAGNOSIS — F84.0 AUTISTIC DISORDER: ICD-10-CM

## 2025-05-23 PROCEDURE — 99395 PREV VISIT EST AGE 18-39: CPT

## 2025-05-23 RX ORDER — DIPHENHYDRAMINE HCL 12.5 MG/5ML
SOLUTION ORAL
Qty: 120 ML | Refills: 4 | Status: SHIPPED | OUTPATIENT
Start: 2025-05-23

## 2025-05-23 RX ORDER — ACETAMINOPHEN 160 MG/5ML
320 LIQUID ORAL EVERY 4 HOURS PRN
Qty: 118 ML | Refills: 4 | Status: SHIPPED | OUTPATIENT
Start: 2025-05-23 | End: 2025-05-23

## 2025-05-23 RX ORDER — MULTIVIT-MIN/FOLIC ACID/HRB293 120 MCG-50
2 TABLET,CHEWABLE ORAL DAILY
Qty: 120 TABLET | Refills: 4 | Status: SHIPPED | OUTPATIENT
Start: 2025-05-23

## 2025-05-23 RX ORDER — POLYETHYLENE GLYCOL 3350 17 G/17G
17 POWDER, FOR SOLUTION ORAL DAILY PRN
Qty: 340 G | Refills: 4 | Status: SHIPPED | OUTPATIENT
Start: 2025-05-23 | End: 2025-05-23

## 2025-05-23 RX ORDER — DIPHENHYDRAMINE HCL 12.5 MG/5ML
SOLUTION ORAL
Qty: 120 ML | Refills: 1 | Status: SHIPPED | OUTPATIENT
Start: 2025-05-23 | End: 2025-05-23

## 2025-05-23 RX ORDER — ONDANSETRON 4 MG/1
4 TABLET, ORALLY DISINTEGRATING ORAL EVERY 8 HOURS PRN
Qty: 20 TABLET | Refills: 1 | Status: SHIPPED | OUTPATIENT
Start: 2025-05-23

## 2025-05-23 RX ORDER — MENTHOL
LOZENGE MUCOUS MEMBRANE 3 TIMES DAILY PRN
Qty: 100 G | Refills: 4 | Status: SHIPPED | OUTPATIENT
Start: 2025-05-23 | End: 2025-05-23

## 2025-05-23 RX ORDER — DIAZEPAM 10 MG/1
10 TABLET ORAL DAILY PRN
COMMUNITY
Start: 2025-04-28

## 2025-05-23 RX ORDER — ACETAMINOPHEN 160 MG/5ML
320 LIQUID ORAL EVERY 4 HOURS PRN
Qty: 118 ML | Refills: 4 | Status: SHIPPED | OUTPATIENT
Start: 2025-05-23

## 2025-05-23 RX ORDER — DIPHENHYDRAMINE HCL 12.5 MG/5ML
SOLUTION ORAL
Qty: 120 ML | Refills: 4 | Status: SHIPPED | OUTPATIENT
Start: 2025-05-23 | End: 2025-05-23

## 2025-05-23 RX ORDER — HYDROXYZINE HCL 10 MG/5 ML
10 SOLUTION, ORAL ORAL
COMMUNITY
Start: 2025-05-01

## 2025-05-23 RX ORDER — MENTHOL
LOZENGE MUCOUS MEMBRANE 3 TIMES DAILY PRN
Qty: 100 G | Refills: 4 | Status: SHIPPED | OUTPATIENT
Start: 2025-05-23

## 2025-05-23 RX ORDER — POLYETHYLENE GLYCOL 3350 17 G/17G
17 POWDER, FOR SOLUTION ORAL DAILY PRN
Qty: 340 G | Refills: 4 | Status: SHIPPED | OUTPATIENT
Start: 2025-05-23 | End: 2025-06-02

## 2025-05-23 NOTE — PROGRESS NOTES
Adult Annual Physical  Name: Jared Reyes      : 2000      MRN: [id number]  Encounter Provider: Shyla Sparrow PA-C  Encounter Date: 2025   Encounter department: [facility]    :  Assessment & Plan  Annual physical exam  Physical exam performed by nurse Daron at the [facility] facility that the patient resides at.  Patient extremely anxious at office visits, was reasonably cooperative with exam today with nursing relaying physical exam findings.  He was unable to attend a prior visit at the office, even after receiving 10 mg of diazepam prior to the visit.  No concerning issues identified during visit today.    Due to patient's lack of cooperation with testing, discussed with mother whether lab work was required this year.  Last lab work was in  and was generally within normal limits.  Patient does not show signs of anemia, electrolyte imbalance.  He does have a weight gain and with being on risperidone should consider getting a lipid panel at some point.  However, due to patient's lack of cooperation with testing, will skip this year and consider next year.  Patient is also due for pneumonia vaccine and for the same reasons we will defer to a later time when he may need multiple vaccines at once.  Patient is not sexually active so HPV would not be recommended at this time.  The last time he needed a tetanus vaccine it took 6 people to hold him in order to administer it.  Orders:  •  Multiple Vitamins-Minerals (Alive Mens Gummy Multivitamins) CHEW; Chew 2 each in the morning  •  Sunscreen Sport SPF 70 LOTN; Apply topically every 4 (four) hours as needed (when outside)    Pain, generalized  To use as needed for pain.  Orders:  •  acetaminophen (TYLENOL) 160 mg/5 mL liquid; Take 10 mL (320 mg total) by mouth every 4 (four) hours as needed for mild pain or moderate pain    Constipation, unspecified constipation type  Occasional issues with constipation.  Well-managed with  using MiraLAX as needed.  Also has Dulcolax if no bowel movement in 3 days.  Orders:  •  polyethylene glycol (GLYCOLAX) 17 GM/SCOOP powder; Take 17 g by mouth daily as needed (Constipation) for up to 10 days Like use one capful in 6 ounces of water/juice as needed for no bowel movement after 2 days. Use only when needed for regular bowel movements    Cough, unspecified type  To use as needed for cough and congestion.  Orders:  •  Camphor-Eucalyptus-Menthol (Vicks Vaporub) 4.73-1.2-2.6 % OINT; Apply topically 3 (three) times a day as needed (cough) apply to chest and bottom of feet and then put socks on. To use as needed for coughing.    Congestion of nasal sinus  To use as needed for congestion.  Orders:  •  diphenhydrAMINE (Diphenhist) 12.5 mg/5 mL oral liquid; Take 10 ml PO EVERY 6 HOURS PRN congestion.    Nausea and vomiting, unspecified vomiting type  Not currently having symptoms, but would like the medications on hand.  Uses very infrequently.  Orders:  •  ondansetron (ZOFRAN-ODT) 4 mg disintegrating tablet; Take 1 tablet (4 mg total) by mouth every 8 (eight) hours as needed for nausea or vomiting    Autistic disorder  Patient profoundly autistic.  Generally cooperative with exam today with nursing present and able to relay physical exam findings.      Mild intermittent asthma without complication  Stable, not currently on inhalers.         Follow-up in 1 year or sooner if issues arise.  Preventive Screenings:    - Prostate cancer screening: screening not indicated     Immunizations:  - Immunizations due: Prevnar 20, HPV (Gardasil 9) and Hepatitis A         History of Present Illness     Adult Annual Physical    Annual physical conducted virtually with the assistance of Daron, a nurse at his group home facility, and his mother, Cely.      Vital signs taken by nursing.  Increased his risperidone and has had a 13 lb increase over last year.    I was able to get a limited physical exam with the assistance of  "Daron.  Mom has no concerns.  Did a thorough med reconciliation and reordered meds.      Review of Systems   Constitutional: Negative.    HENT: Negative.     Respiratory: Negative.     Cardiovascular: Negative.    Gastrointestinal:  Positive for constipation. Negative for abdominal distention, abdominal pain, blood in stool, diarrhea, nausea and vomiting.   Genitourinary:  Negative for decreased urine volume, difficulty urinating, frequency and urgency.   Musculoskeletal:  Negative for gait problem.   Skin: Negative.    Neurological: Negative.    Psychiatric/Behavioral:  Positive for behavioral problems, confusion and decreased concentration. Negative for self-injury and sleep disturbance.    All other systems reviewed and are negative.    Medical History Reviewed by provider this encounter:  Tobacco  Allergies  Meds  Problems  Med Hx  Surg Hx  Fam Hx     .  Medications Ordered Prior to Encounter[1]   Social History[2]    Objective   /78 (BP Location: Left arm, Patient Position: Sitting, Cuff Size: Standard)   Pulse 72   Temp 97.9 °F (36.6 °C) (Temporal)   Resp 18   Ht 5' 10\" (1.778 m)   Wt 102 kg (225 lb)   SpO2 99%   BMI 32.28 kg/m²   Physical exam performed by nurse Daron present at the facility where the patient resides.      Physical Exam  Constitutional:       Appearance: He is not toxic-appearing.     Cardiovascular:      Rate and Rhythm: Normal rate and regular rhythm.      Heart sounds: Normal heart sounds.   Pulmonary:      Effort: Pulmonary effort is normal. No respiratory distress.      Breath sounds: Normal breath sounds.   Abdominal:      General: Bowel sounds are normal.      Palpations: Abdomen is soft.      Tenderness: There is no abdominal tenderness.     Neurological:      Mental Status: He is alert.      Motor: No weakness or tremor.      Coordination: Coordination is intact.      Gait: Gait is intact.      Comments: Patient profoundly autistic.  He is seen on camera ambulating " throughout the facility moving all 4 extremities.  He responds appropriately to questions and follows commands.   Psychiatric:         Behavior: Behavior is cooperative.         Administrative Statements   Encounter provider Shyla Sparrow PA-C    The Patient is located at Home and in the following state in which I hold an active license [place].    The patient was identified by name and date of birth. Jared Reyes was informed that this is a telemedicine visit and that the visit is being conducted through the Epic Embedded platform. He agrees to proceed..  My office door was closed. No one else was in the room.  He acknowledged consent and understanding of privacy and security of the video platform. The patient has agreed to participate and understands they can discontinue the visit at any time.    I have spent a total time of 40 minutes in caring for this patient on the day of the visit/encounter including Risks and benefits of tx options, Patient and family education, Impressions, Counseling / Coordination of care, Documenting in the medical record, Reviewing/placing orders in the medical record (including tests, medications, and/or procedures), Obtaining or reviewing history  , and Communicating with other healthcare professionals , not including the time spent for establishing the audio/video connection.            [1]  Current Outpatient Medications on File Prior to Visit   Medication Sig Dispense Refill   • diazepam (VALIUM) 10 mg tablet Take 10 mg by mouth daily as needed for anxiety Take 1 tablet by mouth once every day as needed 1 hour prior to nail clippings or medical appointments with fruit snacks or diluted in 5 mL of water.  Administered by syringe.  Please skip temazepam dose if valium taken within 8 hours of scheduled dose (max of 1 dose/24 hours)     • hydrOXYzine (ATARAX) 10 mg/5 mL syrup Take 10 mg by mouth     • benzonatate (TESSALON PERLES) 100 mg capsule Take 1 capsule (100 mg total) by mouth  3 (three) times a day as needed for cough 20 capsule 1   • bisacodyl (DULCOLAX) 5 mg EC tablet Take 1 tablet (5 mg total) by mouth daily as needed for constipation (Use daily if no Bowel Movement in 3 days.  Stop once having bowel movement.) 30 tablet 0   • busPIRone (BUSPAR) 15 mg tablet Take 20 mg by mouth in the morning and 20 mg in the evening. Crust two tablets (20 mg) diluted in water, take twice daily.     • guaiFENesin (ROBITUSSIN) 100 MG/5ML oral liquid Take 10 mL (200 mg total) by mouth 3 (three) times a day as needed for cough 120 mL 1   • guanFACINE (TENEX) 2 MG tablet Take 2 mg by mouth 2 (two) times a day  0   • Incontinence Supply Disposable (Depend Real Fit Underwear/Men) MISC Use 1 Units daily at bedtime 30 each 2   • risperiDONE (RisperDAL) 1 mg/mL oral solution Take 2 mg by mouth 2 (two) times a day  1   • temazepam (RESTORIL) 15 mg capsule Take 15 mg by mouth daily at bedtime     • [DISCONTINUED] acetaminophen (TYLENOL) 160 mg/5 mL liquid Take 10 mL (320 mg total) by mouth every 4 (four) hours as needed for mild pain or moderate pain 118 mL 2   • [DISCONTINUED] Camphor-Eucalyptus-Menthol (Vicks Vaporub) 4.73-1.2-2.6 % OINT Apply topically 3 (three) times a day as needed (cough) apply to chest and bottom of feet and then put socks on. To use as needed for coughing. 100 g 4   • [DISCONTINUED] diphenhydrAMINE (Diphenhist) 12.5 mg/5 mL oral liquid Take 10 ml PO EVERY 6 HOURS PRN congestion. 120 mL 1   • [DISCONTINUED] Multiple Vitamins-Minerals (Alive Mens Gummy Multivitamins) CHEW Chew 2 each in the morning 120 tablet 4   • [DISCONTINUED] ondansetron (ZOFRAN-ODT) 4 mg disintegrating tablet Take 1 tablet (4 mg total) by mouth every 8 (eight) hours as needed for nausea or vomiting 20 tablet 1   • [DISCONTINUED] polyethylene glycol (GLYCOLAX) 17 GM/SCOOP powder Take 17 g by mouth daily as needed (Constipation) for up to 10 days Like use one capful in 6 ounces of water/juice as needed for no bowel  movement after 2 days. Use only when needed for regular bowel movements 340 g 4   • [DISCONTINUED] Sunscreen Sport SPF 70 LOTN Apply topically every 4 (four) hours as needed (when outside) 112 mL 5     No current facility-administered medications on file prior to visit.   [2]  Social History  Tobacco Use   • Smoking status: Never   • Smokeless tobacco: Never   Substance and Sexual Activity   • Alcohol use: Never   • Drug use: Never   • Sexual activity: Never

## 2025-05-23 NOTE — ASSESSMENT & PLAN NOTE
Occasional issues with constipation.  Well-managed with using MiraLAX as needed.  Also has Dulcolax if no bowel movement in 3 days.  Orders:  •  polyethylene glycol (GLYCOLAX) 17 GM/SCOOP powder; Take 17 g by mouth daily as needed (Constipation) for up to 10 days Like use one capful in 6 ounces of water/juice as needed for no bowel movement after 2 days. Use only when needed for regular bowel movements

## 2025-05-23 NOTE — ASSESSMENT & PLAN NOTE
Patient profoundly autistic.  Generally cooperative with exam today with nursing present and able to relay physical exam findings.